# Patient Record
Sex: MALE | Race: WHITE | NOT HISPANIC OR LATINO | Employment: FULL TIME | URBAN - METROPOLITAN AREA
[De-identification: names, ages, dates, MRNs, and addresses within clinical notes are randomized per-mention and may not be internally consistent; named-entity substitution may affect disease eponyms.]

---

## 2021-09-23 ENCOUNTER — OFFICE VISIT (OUTPATIENT)
Dept: ORTHOPEDIC SURGERY | Facility: CLINIC | Age: 26
End: 2021-09-23

## 2021-09-23 VITALS
SYSTOLIC BLOOD PRESSURE: 135 MMHG | DIASTOLIC BLOOD PRESSURE: 87 MMHG | HEIGHT: 71 IN | BODY MASS INDEX: 32.9 KG/M2 | WEIGHT: 235 LBS

## 2021-09-23 DIAGNOSIS — R52 PAIN: Primary | ICD-10-CM

## 2021-09-23 DIAGNOSIS — S83.411A SPRAIN OF MEDIAL COLLATERAL LIGAMENT OF RIGHT KNEE, INITIAL ENCOUNTER: ICD-10-CM

## 2021-09-23 DIAGNOSIS — S83.271A COMPLEX TEAR OF LATERAL MENISCUS OF RIGHT KNEE AS CURRENT INJURY, INITIAL ENCOUNTER: ICD-10-CM

## 2021-09-23 DIAGNOSIS — S83.511A RUPTURE OF ANTERIOR CRUCIATE LIGAMENT OF RIGHT KNEE, INITIAL ENCOUNTER: ICD-10-CM

## 2021-09-23 PROCEDURE — 73564 X-RAY EXAM KNEE 4 OR MORE: CPT | Performed by: ORTHOPAEDIC SURGERY

## 2021-09-23 PROCEDURE — 99203 OFFICE O/P NEW LOW 30 MIN: CPT | Performed by: ORTHOPAEDIC SURGERY

## 2021-09-23 RX ORDER — BUPROPION HYDROCHLORIDE 150 MG/1
150 TABLET ORAL DAILY
COMMUNITY
Start: 2021-09-15

## 2021-09-23 RX ORDER — LEVOCETIRIZINE DIHYDROCHLORIDE 5 MG/1
5 TABLET, FILM COATED ORAL DAILY PRN
COMMUNITY

## 2021-09-23 RX ORDER — METHYLPREDNISOLONE 4 MG/1
TABLET ORAL
Qty: 1 EACH | Refills: 0 | Status: SHIPPED | OUTPATIENT
Start: 2021-09-23 | End: 2021-11-12

## 2021-09-23 RX ORDER — IBUPROFEN 800 MG/1
TABLET ORAL
COMMUNITY
Start: 2021-09-15 | End: 2021-11-12

## 2021-09-23 RX ORDER — MELOXICAM 15 MG/1
15 TABLET ORAL DAILY
Qty: 30 TABLET | Refills: 0 | Status: SHIPPED | OUTPATIENT
Start: 2021-09-23 | End: 2021-10-14

## 2021-09-23 NOTE — PROGRESS NOTES
"Subjective:     Patient ID: Zeyad Pedro is a 25 y.o. male.    Chief Complaint:  Right knee pain and instability  History of Present Illness  Zeyad Pedro presents to clinic today for evaluation of right knee pain. The patient reports a soccer injury that occurred on 09/14/2021 where he felt his knee \"pop out of place\" and felt like \"glass shattering\" on both sides. He reports his pain has gradually increased since onset of injury, since his swelling subsided. He notes an increase of pain occurred after obtaining his MRI. The patient describes throbbing, catching, and tightness in nature, localized to the medial joint line.  He rates pain as a 7-8 out of 10.  He denies numbness or tingling. He reports intermittent purple discoloration in his right foot.     Social History     Occupational History   • Not on file   Tobacco Use   • Smoking status: Never Smoker   • Smokeless tobacco: Never Used   Vaping Use   • Vaping Use: Never used   Substance and Sexual Activity   • Alcohol use: No   • Drug use: No   • Sexual activity: Defer      Past Medical History:   Diagnosis Date   • Fracture, tibia and fibula      Past Surgical History:   Procedure Laterality Date   • KNEE ACL RECONSTRUCTION Left 09/24/2015       History reviewed. No pertinent family history.      Review of Systems   Constitutional: Negative for chills, diaphoresis, fever and unexpected weight change.   HENT: Negative for hearing loss, nosebleeds, sore throat and tinnitus.    Eyes: Negative for pain and visual disturbance.   Respiratory: Negative for cough, shortness of breath and wheezing.    Cardiovascular: Negative for chest pain and palpitations.   Gastrointestinal: Negative for abdominal pain, diarrhea, nausea and vomiting.   Endocrine: Negative for cold intolerance, heat intolerance and polydipsia.   Genitourinary: Negative for difficulty urinating, dysuria and hematuria.   Musculoskeletal: Positive for arthralgias and myalgias. Negative for joint " "swelling.   Skin: Negative for rash and wound.   Allergic/Immunologic: Negative for environmental allergies.   Neurological: Negative for dizziness, syncope and numbness.   Hematological: Does not bruise/bleed easily.   Psychiatric/Behavioral: Negative for dysphoric mood and sleep disturbance. The patient is not nervous/anxious.            Objective:  Vitals:    09/23/21 1435   BP: 135/87   BP Location: Right arm   Weight: 107 kg (235 lb)   Height: 180.3 cm (71\")         09/23/21  1435   Weight: 107 kg (235 lb)     Body mass index is 32.78 kg/m².  Physical Exam    Vital signs reviewed.   General: No acute distress, alert and oriented  Eyes: conjunctiva clear; pupils equally round and reactive  ENT: external ears and nose atraumatic; oropharynx clear  CV: no peripheral edema  Resp: normal respiratory effort  Skin: no rashes or wounds; normal turgor  Psych: mood and affect appropriate; recent and remote memory intact          Ortho Exam     Right knee-  Moderate effusion noted, maximal tenderness along medial joint line as well as over the proximal MCL.  Grade 1 opening on valgus stress at 30 degrees, no opening at full extension.  No opening on varus stress at 0 or 30 degrees.  Tolerates right knee range of motion active and passive 0 to 90 degrees.  Grade 2B Lockman, 2+ anterior drawer with soft endpoint, negative posterior drawer, negative posterior lateral drawer, negative dial test.  Mildly positive active patellar compression test.  Positive sensation light touch all distributions right foot symmetric left, brisk cap refill all digits, 2+ dorsalis pedis pulse.    Imaging:  Reviewed outside MRI including review of radiology images as well as radiology report indicates complete disruption of ACL with mild LCL sprain and moderate sprain of the proximal MCL noted, complex tear the posterior horn lateral meniscus, no evidence of medial meniscal tear, region of chondral loss noted in the posterior lateral aspect of " the lateral tibial plateau with loose v. subchondral edema noted in the lateral femoral condyle and lateral tibial plateau consistent with pivot shift phenomenon.    Right Knee X-Ray  Indication: Pain    AP, Lateral, notch and [place] views    Findings:  No fracture  No bony lesion  Normal soft tissues  Normal joint spaces    No prior studies were available for comparison.      Assessment:        1. Pain    2. Rupture of anterior cruciate ligament of right knee, initial encounter    3. Complex tear of lateral meniscus of right knee as current injury, initial encounter    4. Sprain of medial collateral ligament of right knee, initial encounter           Plan:          1. Discussed treatment options at length with patient at today's visit. Including the use of a knee brace for support, and a short course oral steroid followed by an anti-inflammatory for 10 days.   2. Patient was given knee brace today for support to try to allow for early healing of the MCL strain. I advised the patient to wear this constantly for better support.   3. 5-day course of a oral steroid and Meloxicam sent to patient pharmacy for residual pain and inflammation.  4. Follow-up: 3 weeks to reevaluate his right knee.  If his motion and effusion are better at that point time we will discuss further options for surgical treatment in light of his lateral meniscus tear and ACL rupture as well as persistent instability.      Zeyad Willis was in agreement with plan and had all questions answered.     Orders:  Orders Placed This Encounter   Procedures   • XR Knee 4+ View Right       Medications:  New Medications Ordered This Visit   Medications   • methylPREDNISolone (MEDROL) 4 MG dose pack     Sig: Take as directed on package instructions.     Dispense:  1 each     Refill:  0   • meloxicam (MOBIC) 15 MG tablet     Sig: Take 1 tablet by mouth Daily.     Dispense:  30 tablet     Refill:  0       Followup:  No follow-ups on file.    Diagnoses and all  orders for this visit:    1. Pain (Primary)  -     XR Knee 4+ View Right    2. Rupture of anterior cruciate ligament of right knee, initial encounter    3. Complex tear of lateral meniscus of right knee as current injury, initial encounter    4. Sprain of medial collateral ligament of right knee, initial encounter    Other orders  -     methylPREDNISolone (MEDROL) 4 MG dose pack; Take as directed on package instructions.  Dispense: 1 each; Refill: 0  -     meloxicam (MOBIC) 15 MG tablet; Take 1 tablet by mouth Daily.  Dispense: 30 tablet; Refill: 0          Dictated utilizing Dragon dictation     Scribed for Negro Sahu MD by Arslan Russo.  9/28/2021  17:17 EDT

## 2021-10-14 ENCOUNTER — OFFICE VISIT (OUTPATIENT)
Dept: ORTHOPEDIC SURGERY | Facility: CLINIC | Age: 26
End: 2021-10-14

## 2021-10-14 VITALS — WEIGHT: 235 LBS | BODY MASS INDEX: 32.9 KG/M2 | HEIGHT: 71 IN

## 2021-10-14 DIAGNOSIS — S83.511D RUPTURE OF ANTERIOR CRUCIATE LIGAMENT OF RIGHT KNEE, SUBSEQUENT ENCOUNTER: Primary | ICD-10-CM

## 2021-10-14 DIAGNOSIS — S83.271D COMPLEX TEAR OF LATERAL MENISCUS OF RIGHT KNEE AS CURRENT INJURY, SUBSEQUENT ENCOUNTER: ICD-10-CM

## 2021-10-14 DIAGNOSIS — S83.411D SPRAIN OF MEDIAL COLLATERAL LIGAMENT OF RIGHT KNEE, SUBSEQUENT ENCOUNTER: ICD-10-CM

## 2021-10-14 PROCEDURE — 99214 OFFICE O/P EST MOD 30 MIN: CPT | Performed by: ORTHOPAEDIC SURGERY

## 2021-10-14 RX ORDER — MELOXICAM 15 MG/1
TABLET ORAL
Qty: 30 TABLET | Refills: 0 | Status: SHIPPED | OUTPATIENT
Start: 2021-10-14 | End: 2021-11-12

## 2021-10-14 NOTE — PROGRESS NOTES
"Subjective:     Patient ID: Zeyad Pedro is a 25 y.o. male.    Chief Complaint:  Follow-up right knee pain and instability, ACL tear, lateral meniscus tear, MCL sprain  Medrol Dosepak followed by meloxicam to help with effusion    History of Present Illness     Zeyad Pedro returns to clinic today for follow up on his right knee.    The patient is making progress. He notes that he still has some mild residual pain, primarily over the proximal MCL and lateral joint line. He rates his pain as a 2 to 3 out of 10. He feels like his stability and motion have moderately improved with the medications we gave him last time. He is still having concerns for buckling and instability episodes, particularly when he is out of his brace. He would like to proceed with surgical treatment at this time     Social History     Occupational History   • Not on file   Tobacco Use   • Smoking status: Never Smoker   • Smokeless tobacco: Never Used   Vaping Use   • Vaping Use: Never used   Substance and Sexual Activity   • Alcohol use: No   • Drug use: No   • Sexual activity: Defer      Past Medical History:   Diagnosis Date   • Fracture, tibia and fibula      Past Surgical History:   Procedure Laterality Date   • KNEE ACL RECONSTRUCTION Left 09/24/2015       No family history on file.      Review of Systems        Objective:  Vitals:    10/14/21 1340   Weight: 107 kg (235 lb)   Height: 180.3 cm (71\")         10/14/21  1340   Weight: 107 kg (235 lb)     Body mass index is 32.78 kg/m².  Physical Exam    Vital signs reviewed.   General: No acute distress, alert and oriented  Eyes: conjunctiva clear; pupils equally round and reactive  ENT: external ears and nose atraumatic; oropharynx clear  CV: no peripheral edema  Resp: normal respiratory effort  Skin: no rashes or wounds; normal turgor  Psych: mood and affect appropriate; recent and remote memory intact          Ortho Exam       Right Knee -      ROM 0 to 110 degrees  4/5 on flexion  4/5 on " extension  Maximal tenderness over the proximal MCL region. His swelling has improved.   Moderate tenderness lateral joint line     Effusion - Moderate residual   Grade 2B Lachman  Anterior drawer- 2+ with soft endpoint.  Posterior drawer- Negative  Posterior lateral drawer - Negative  Mild Grade 1 opening on valgus stress at 30 degrees    Dial Test - Negative  Berot - Positive     Positive sensation light touch all distributions symmetric to contralateral side  Brisk cap refill all digits  2+ dorsalis pedis pulse    Imaging:  Review again of outside MRI right knee include review of images indicates moderate proximal MCL sprain with complete ACL tear and complex tear posterior horn lateral meniscus    Assessment:        1. Rupture of anterior cruciate ligament of right knee, subsequent encounter    2. Sprain of medial collateral ligament of right knee, subsequent encounter    3. Complex tear of lateral meniscus of right knee as current injury, subsequent encounter           Plan:          1. Discussed treatment options at length with patient at today's visit.   2. The patient is doing better in regards to motion at this point in time. Given the fact that he still has his ACL tear and residual instability, he would like to proceed with surgical treatment.  The patient would like to proceed with ACL reconstruction with hamstring autograft, possible allograft supplement, meniscal and cartilage surgery as indicated and all associated procedures. Reviewed anatomy of the knee, as well as details of procedure, including risks, benefits, and alternatives. Risks discussed included but were not limited to bleeding, infection, neurovascular damage, re-tear of ACL graft, failure of incorporation of graft, disease transmission, chronic pain, laxity, swelling, stiffness, loss of motion, weakness, instability, fracture, re-tear of meniscus, DVT, pulmonary embolus, death, stroke, complex regional pain syndrome, myocardial  infarction, and need for additional procedures. Patient understood all these and had all questions answered, and verbally consented to proceeding with surgery. We discussed typical postop recovery of 6-12 months and no guarantee of being able to return to previous activities. No guarantees were given in regards to the procedure. We will plan on proceeding with surgery at next available date.   3. The patient denies a history of blood clots, diabetes or heart issues.  4. Follow up for surgery.      Zeyad Pedro was in agreement with plan and had all questions answered.     Orders:  Orders Placed This Encounter   Procedures   • COVID PRE-OP / PRE-PROCEDURE SCREENING ORDER (NO ISOLATION) - Swab, Nasopharynx   • Follow Anesthesia Guidelines / Standing Orders       Medications:  No orders of the defined types were placed in this encounter.      Followup:  No follow-ups on file.    Diagnoses and all orders for this visit:    1. Rupture of anterior cruciate ligament of right knee, subsequent encounter (Primary)  -     Case Request; Standing  -     COVID PRE-OP / PRE-PROCEDURE SCREENING ORDER (NO ISOLATION) - Swab, Nasopharynx; Future  -     Case Request    2. Sprain of medial collateral ligament of right knee, subsequent encounter    3. Complex tear of lateral meniscus of right knee as current injury, subsequent encounter  -     Case Request; Standing  -     COVID PRE-OP / PRE-PROCEDURE SCREENING ORDER (NO ISOLATION) - Swab, Nasopharynx; Future  -     Case Request    Other orders  -     Follow Anesthesia Guidelines / Standing Orders; Future          Dictated utilizing Dragon dictation     Transcribed from ambient dictation for Negro Sahu MD by Kyle Colby.  10/14/21   17:46 EDT    I have personally performed the services described in this document as transcribed by the above individual, and it is both accurate and complete.  Negro Sahu MD  10/28/2021  07:44 EDT

## 2021-10-28 RX ORDER — MELOXICAM 7.5 MG/1
7.5 TABLET ORAL ONCE
Status: CANCELLED | OUTPATIENT
Start: 2021-10-28 | End: 2021-10-28

## 2021-10-28 RX ORDER — ACETAMINOPHEN 325 MG/1
1000 TABLET ORAL ONCE
Status: CANCELLED | OUTPATIENT
Start: 2021-10-28 | End: 2021-10-28

## 2021-10-28 RX ORDER — PREGABALIN 150 MG/1
150 CAPSULE ORAL ONCE
Status: CANCELLED | OUTPATIENT
Start: 2021-10-28 | End: 2021-10-28

## 2021-11-11 ENCOUNTER — TELEPHONE (OUTPATIENT)
Dept: ORTHOPEDIC SURGERY | Facility: CLINIC | Age: 26
End: 2021-11-11

## 2021-11-11 NOTE — TELEPHONE ENCOUNTER
Provider: DR. JIMENEZ    Caller: Zeyad Pedro     Relationship to Patient: SELF    Phone Number: [phone]    Reason for Call: PATIENT ADVISED HE HAS SOME PER SURGERY QUESTION FOR DR. JIMENEZ. PATIENT WANTS TO KNOW WHAT HE SHOULD DO TO PREPARE FOR SURGERY? HE ALSO WANTS TO KNOW IS HE ALLOWED TO HAVE GUEST IN HIS ROOM BEFORE SURGERY? PATIENT WANTS TO KNOW WILL THE OFFICE SCHEDULE A COVID TEST BEFORE SURGERY? PLEASE CONTACT PATIENT BACK INFORM.

## 2021-11-12 RX ORDER — ACETAMINOPHEN 500 MG
1000 TABLET ORAL EVERY 6 HOURS PRN
COMMUNITY
End: 2021-11-19 | Stop reason: HOSPADM

## 2021-11-15 ENCOUNTER — TELEPHONE (OUTPATIENT)
Dept: ORTHOPEDIC SURGERY | Facility: CLINIC | Age: 26
End: 2021-11-15

## 2021-11-15 NOTE — TELEPHONE ENCOUNTER
Caller: Zeyad Pedro    Relationship to patient: Self    Best call back number:[phone]    Patient is needing: PATIENT HAS KNEE SURGERY ON Friday 11/19/21 AND HE HAS SEVERAL QUESTIONS REGARDING THE KNEE BRACE THAT HE WILL NEED AFTER SURGERY, HE ASKED THAT DR. JIMENEZ CALL HIM BACK TODAY.

## 2021-11-18 ENCOUNTER — HOSPITAL ENCOUNTER (OUTPATIENT)
Dept: GENERAL RADIOLOGY | Facility: HOSPITAL | Age: 26
Discharge: HOME | End: 2021-11-18

## 2021-11-18 ENCOUNTER — ANESTHESIA EVENT (OUTPATIENT)
Dept: PERIOP | Facility: HOSPITAL | Age: 26
End: 2021-11-18

## 2021-11-19 ENCOUNTER — ANESTHESIA (OUTPATIENT)
Dept: PERIOP | Facility: HOSPITAL | Age: 26
End: 2021-11-19

## 2021-11-19 ENCOUNTER — HOSPITAL ENCOUNTER (OUTPATIENT)
Facility: HOSPITAL | Age: 26
Setting detail: HOSPITAL OUTPATIENT SURGERY
Discharge: HOME | End: 2021-11-19
Attending: ORTHOPAEDIC SURGERY | Admitting: ORTHOPAEDIC SURGERY

## 2021-11-19 VITALS
WEIGHT: 245.4 LBS | SYSTOLIC BLOOD PRESSURE: 107 MMHG | BODY MASS INDEX: 34.35 KG/M2 | DIASTOLIC BLOOD PRESSURE: 71 MMHG | RESPIRATION RATE: 15 BRPM | TEMPERATURE: 97.6 F | HEART RATE: 81 BPM | OXYGEN SATURATION: 97 % | HEIGHT: 71 IN

## 2021-11-19 DIAGNOSIS — S83.271D COMPLEX TEAR OF LATERAL MENISCUS OF RIGHT KNEE AS CURRENT INJURY, SUBSEQUENT ENCOUNTER: ICD-10-CM

## 2021-11-19 DIAGNOSIS — S83.511D RUPTURE OF ANTERIOR CRUCIATE LIGAMENT OF RIGHT KNEE, SUBSEQUENT ENCOUNTER: ICD-10-CM

## 2021-11-19 LAB
APTT PPP: 30.2 SECONDS (ref 24.3–38.1)
DEPRECATED RDW RBC AUTO: 43.2 FL (ref 37–54)
ERYTHROCYTE [DISTWIDTH] IN BLOOD BY AUTOMATED COUNT: 12.3 % (ref 12.3–15.4)
HCT VFR BLD AUTO: 40.5 % (ref 37.5–51)
HGB BLD-MCNC: 13.1 G/DL (ref 13–17.7)
INR PPP: 1.08 (ref 0.9–1.1)
MCH RBC QN AUTO: 30.1 PG (ref 26.6–33)
MCHC RBC AUTO-ENTMCNC: 32.3 G/DL (ref 31.5–35.7)
MCV RBC AUTO: 93.1 FL (ref 79–97)
PLATELET # BLD AUTO: 210 10*3/MM3 (ref 140–450)
PMV BLD AUTO: 9.6 FL (ref 6–12)
PROTHROMBIN TIME: 14 SECONDS (ref 12.1–15)
RBC # BLD AUTO: 4.35 10*6/MM3 (ref 4.14–5.8)
WBC NRBC COR # BLD: 4.19 10*3/MM3 (ref 3.4–10.8)

## 2021-11-19 PROCEDURE — 85027 COMPLETE CBC AUTOMATED: CPT | Performed by: ORTHOPAEDIC SURGERY

## 2021-11-19 PROCEDURE — C1713 ANCHOR/SCREW BN/BN,TIS/BN: HCPCS | Performed by: ORTHOPAEDIC SURGERY

## 2021-11-19 PROCEDURE — 0 CEFAZOLIN SODIUM-DEXTROSE 2-3 GM-%(50ML) RECONSTITUTED SOLUTION: Performed by: ORTHOPAEDIC SURGERY

## 2021-11-19 PROCEDURE — 76942 ECHO GUIDE FOR BIOPSY: CPT | Performed by: ORTHOPAEDIC SURGERY

## 2021-11-19 PROCEDURE — 25010000002 FENTANYL CITRATE (PF) 50 MCG/ML SOLUTION: Performed by: NURSE ANESTHETIST, CERTIFIED REGISTERED

## 2021-11-19 PROCEDURE — 29888 ARTHRS AID ACL RPR/AGMNTJ: CPT | Performed by: SPECIALIST/TECHNOLOGIST, OTHER

## 2021-11-19 PROCEDURE — 29882 ARTHRS KNE SRG MNISC RPR M/L: CPT | Performed by: ORTHOPAEDIC SURGERY

## 2021-11-19 PROCEDURE — 85610 PROTHROMBIN TIME: CPT | Performed by: ORTHOPAEDIC SURGERY

## 2021-11-19 PROCEDURE — 25010000002 DEXAMETHASONE PER 1 MG: Performed by: NURSE ANESTHETIST, CERTIFIED REGISTERED

## 2021-11-19 PROCEDURE — 25010000002 ONDANSETRON PER 1 MG: Performed by: NURSE ANESTHETIST, CERTIFIED REGISTERED

## 2021-11-19 PROCEDURE — 25010000002 ROPIVACAINE PER 1 MG: Performed by: NURSE ANESTHETIST, CERTIFIED REGISTERED

## 2021-11-19 PROCEDURE — C1889 IMPLANT/INSERT DEVICE, NOC: HCPCS | Performed by: ORTHOPAEDIC SURGERY

## 2021-11-19 PROCEDURE — 85730 THROMBOPLASTIN TIME PARTIAL: CPT | Performed by: ORTHOPAEDIC SURGERY

## 2021-11-19 PROCEDURE — 29888 ARTHRS AID ACL RPR/AGMNTJ: CPT | Performed by: ORTHOPAEDIC SURGERY

## 2021-11-19 PROCEDURE — 25010000002 MIDAZOLAM PER 1MG: Performed by: NURSE ANESTHETIST, CERTIFIED REGISTERED

## 2021-11-19 PROCEDURE — 25010000002 PROPOFOL 10 MG/ML EMULSION: Performed by: NURSE ANESTHETIST, CERTIFIED REGISTERED

## 2021-11-19 PROCEDURE — 25010000002 HYDROMORPHONE 1 MG/ML SOLUTION: Performed by: NURSE ANESTHETIST, CERTIFIED REGISTERED

## 2021-11-19 PROCEDURE — 87635 SARS-COV-2 COVID-19 AMP PRB: CPT | Performed by: ORTHOPAEDIC SURGERY

## 2021-11-19 DEVICE — CORTICAL SCREW 4.5MM X 42MM: Type: IMPLANTABLE DEVICE | Site: KNEE | Status: FUNCTIONAL

## 2021-11-19 DEVICE — SUT NONABS LOOPED W/STR/NDL COBR SZ2 20IN WHT/BLU: Type: IMPLANTABLE DEVICE | Site: KNEE | Status: FUNCTIONAL

## 2021-11-19 DEVICE — FAST-FIX 360 CURVED MENISCAL                                    REPAIR SYSTEM
Type: IMPLANTABLE DEVICE | Site: KNEE | Status: FUNCTIONAL
Brand: FAST-FIX

## 2021-11-19 DEVICE — ULTRABRAID NO.2 WHITE SUTURE AND                                    NEEDLE ASSEMBLY, 38 INCH, 10 PER                                    BOX, STERILE
Type: IMPLANTABLE DEVICE | Site: KNEE | Status: FUNCTIONAL
Brand: ULTRABRAID

## 2021-11-19 DEVICE — ULTRABUTTON ADJUSTABLE FIXATION DEVICE
Type: IMPLANTABLE DEVICE | Site: KNEE | Status: FUNCTIONAL
Brand: ULTRABUTTON

## 2021-11-19 DEVICE — FAST-FIX 360 REVERSED CURVE                                    MENISCAL REPAIR SYSTEM
Type: IMPLANTABLE DEVICE | Site: KNEE | Status: FUNCTIONAL
Brand: FAST-FIX

## 2021-11-19 DEVICE — SPIKED WASHER 17MM: Type: IMPLANTABLE DEVICE | Site: KNEE | Status: FUNCTIONAL

## 2021-11-19 DEVICE — TNDN SEMITENDENOSIS FZ MIN4MM 004023: Type: IMPLANTABLE DEVICE | Site: KNEE | Status: FUNCTIONAL

## 2021-11-19 DEVICE — SUT NONABS LOOPED W/STR/NDL SZ2 20IN BLU: Type: IMPLANTABLE DEVICE | Site: KNEE | Status: FUNCTIONAL

## 2021-11-19 DEVICE — BIOSURE REGENSORB INTERFERENCE                                    SCREW 10 MM X 30MM
Type: IMPLANTABLE DEVICE | Site: KNEE | Status: FUNCTIONAL
Brand: BIOSURE

## 2021-11-19 RX ORDER — MINERAL OIL 471.99 G/472ML
OIL TOPICAL AS NEEDED
Status: DISCONTINUED | OUTPATIENT
Start: 2021-11-19 | End: 2021-11-19 | Stop reason: HOSPADM

## 2021-11-19 RX ORDER — MIDAZOLAM HYDROCHLORIDE 2 MG/2ML
1 INJECTION, SOLUTION INTRAMUSCULAR; INTRAVENOUS
Status: DISCONTINUED | OUTPATIENT
Start: 2021-11-19 | End: 2021-11-19 | Stop reason: HOSPADM

## 2021-11-19 RX ORDER — CEFAZOLIN SODIUM 2 G/50ML
2 SOLUTION INTRAVENOUS ONCE
Status: COMPLETED | OUTPATIENT
Start: 2021-11-19 | End: 2021-11-19

## 2021-11-19 RX ORDER — DEXAMETHASONE SODIUM PHOSPHATE 4 MG/ML
INJECTION, SOLUTION INTRA-ARTICULAR; INTRALESIONAL; INTRAMUSCULAR; INTRAVENOUS; SOFT TISSUE AS NEEDED
Status: DISCONTINUED | OUTPATIENT
Start: 2021-11-19 | End: 2021-11-19

## 2021-11-19 RX ORDER — FAMOTIDINE 10 MG/ML
20 INJECTION, SOLUTION INTRAVENOUS
Status: DISCONTINUED | OUTPATIENT
Start: 2021-11-19 | End: 2021-11-19 | Stop reason: HOSPADM

## 2021-11-19 RX ORDER — SODIUM CHLORIDE 0.9 % (FLUSH) 0.9 %
10 SYRINGE (ML) INJECTION AS NEEDED
Status: DISCONTINUED | OUTPATIENT
Start: 2021-11-19 | End: 2021-11-19 | Stop reason: HOSPADM

## 2021-11-19 RX ORDER — ROPIVACAINE HYDROCHLORIDE 2 MG/ML
INJECTION, SOLUTION EPIDURAL; INFILTRATION; PERINEURAL
Status: COMPLETED | OUTPATIENT
Start: 2021-11-19 | End: 2021-11-19

## 2021-11-19 RX ORDER — ONDANSETRON 2 MG/ML
4 INJECTION INTRAMUSCULAR; INTRAVENOUS ONCE AS NEEDED
Status: COMPLETED | OUTPATIENT
Start: 2021-11-19 | End: 2021-11-19

## 2021-11-19 RX ORDER — ASPIRIN 81 MG/1
81 TABLET ORAL DAILY
Qty: 14 TABLET | Refills: 0 | Status: SHIPPED | OUTPATIENT
Start: 2021-11-19 | End: 2021-12-03

## 2021-11-19 RX ORDER — ONDANSETRON 4 MG/1
4 TABLET, FILM COATED ORAL EVERY 8 HOURS PRN
Qty: 30 TABLET | Refills: 0 | Status: SHIPPED | OUTPATIENT
Start: 2021-11-19 | End: 2021-11-29

## 2021-11-19 RX ORDER — KETAMINE HYDROCHLORIDE 10 MG/ML
INJECTION INTRAMUSCULAR; INTRAVENOUS AS NEEDED
Status: DISCONTINUED | OUTPATIENT
Start: 2021-11-19 | End: 2021-11-19 | Stop reason: SURG

## 2021-11-19 RX ORDER — PROPOFOL 10 MG/ML
VIAL (ML) INTRAVENOUS AS NEEDED
Status: DISCONTINUED | OUTPATIENT
Start: 2021-11-19 | End: 2021-11-19 | Stop reason: SURG

## 2021-11-19 RX ORDER — MAGNESIUM HYDROXIDE 1200 MG/15ML
LIQUID ORAL AS NEEDED
Status: DISCONTINUED | OUTPATIENT
Start: 2021-11-19 | End: 2021-11-19 | Stop reason: HOSPADM

## 2021-11-19 RX ORDER — SODIUM CHLORIDE 0.9 % (FLUSH) 0.9 %
10 SYRINGE (ML) INJECTION EVERY 12 HOURS SCHEDULED
Status: DISCONTINUED | OUTPATIENT
Start: 2021-11-19 | End: 2021-11-19 | Stop reason: HOSPADM

## 2021-11-19 RX ORDER — LIDOCAINE HYDROCHLORIDE 10 MG/ML
0.5 INJECTION, SOLUTION EPIDURAL; INFILTRATION; INTRACAUDAL; PERINEURAL ONCE AS NEEDED
Status: COMPLETED | OUTPATIENT
Start: 2021-11-19 | End: 2021-11-19

## 2021-11-19 RX ORDER — SODIUM CHLORIDE, SODIUM LACTATE, POTASSIUM CHLORIDE, CALCIUM CHLORIDE 600; 310; 30; 20 MG/100ML; MG/100ML; MG/100ML; MG/100ML
9 INJECTION, SOLUTION INTRAVENOUS CONTINUOUS
Status: DISCONTINUED | OUTPATIENT
Start: 2021-11-19 | End: 2021-11-19 | Stop reason: HOSPADM

## 2021-11-19 RX ORDER — MELOXICAM 7.5 MG/1
7.5 TABLET ORAL ONCE
Status: COMPLETED | OUTPATIENT
Start: 2021-11-19 | End: 2021-11-19

## 2021-11-19 RX ORDER — DEXMEDETOMIDINE HYDROCHLORIDE 100 UG/ML
INJECTION, SOLUTION INTRAVENOUS AS NEEDED
Status: DISCONTINUED | OUTPATIENT
Start: 2021-11-19 | End: 2021-11-19 | Stop reason: SURG

## 2021-11-19 RX ORDER — SENNA PLUS 8.6 MG/1
1 TABLET ORAL NIGHTLY
Qty: 30 TABLET | Refills: 0 | Status: SHIPPED | OUTPATIENT
Start: 2021-11-19 | End: 2021-12-16

## 2021-11-19 RX ORDER — SODIUM CHLORIDE, SODIUM LACTATE, POTASSIUM CHLORIDE, CALCIUM CHLORIDE 600; 310; 30; 20 MG/100ML; MG/100ML; MG/100ML; MG/100ML
100 INJECTION, SOLUTION INTRAVENOUS CONTINUOUS
Status: DISCONTINUED | OUTPATIENT
Start: 2021-11-19 | End: 2021-11-19 | Stop reason: HOSPADM

## 2021-11-19 RX ORDER — FENTANYL CITRATE 50 UG/ML
INJECTION, SOLUTION INTRAMUSCULAR; INTRAVENOUS AS NEEDED
Status: DISCONTINUED | OUTPATIENT
Start: 2021-11-19 | End: 2021-11-19 | Stop reason: SURG

## 2021-11-19 RX ORDER — ACETAMINOPHEN 500 MG
1000 TABLET ORAL ONCE
Status: COMPLETED | OUTPATIENT
Start: 2021-11-19 | End: 2021-11-19

## 2021-11-19 RX ORDER — SODIUM CHLORIDE 9 MG/ML
40 INJECTION, SOLUTION INTRAVENOUS AS NEEDED
Status: DISCONTINUED | OUTPATIENT
Start: 2021-11-19 | End: 2021-11-19 | Stop reason: HOSPADM

## 2021-11-19 RX ORDER — ONDANSETRON 2 MG/ML
4 INJECTION INTRAMUSCULAR; INTRAVENOUS ONCE AS NEEDED
Status: DISCONTINUED | OUTPATIENT
Start: 2021-11-19 | End: 2021-11-19 | Stop reason: HOSPADM

## 2021-11-19 RX ORDER — OXYCODONE AND ACETAMINOPHEN 7.5; 325 MG/1; MG/1
1 TABLET ORAL ONCE AS NEEDED
Status: DISCONTINUED | OUTPATIENT
Start: 2021-11-19 | End: 2021-11-19 | Stop reason: HOSPADM

## 2021-11-19 RX ORDER — OXYCODONE HYDROCHLORIDE AND ACETAMINOPHEN 5; 325 MG/1; MG/1
1 TABLET ORAL EVERY 4 HOURS PRN
Qty: 42 TABLET | Refills: 0 | Status: SHIPPED | OUTPATIENT
Start: 2021-11-19 | End: 2021-11-29

## 2021-11-19 RX ORDER — FENTANYL CITRATE 50 UG/ML
50 INJECTION, SOLUTION INTRAMUSCULAR; INTRAVENOUS
Status: DISCONTINUED | OUTPATIENT
Start: 2021-11-19 | End: 2021-11-19 | Stop reason: HOSPADM

## 2021-11-19 RX ORDER — LIDOCAINE HYDROCHLORIDE 20 MG/ML
INJECTION, SOLUTION INFILTRATION; PERINEURAL AS NEEDED
Status: DISCONTINUED | OUTPATIENT
Start: 2021-11-19 | End: 2021-11-19 | Stop reason: SURG

## 2021-11-19 RX ORDER — DEXAMETHASONE SODIUM PHOSPHATE 4 MG/ML
INJECTION, SOLUTION INTRA-ARTICULAR; INTRALESIONAL; INTRAMUSCULAR; INTRAVENOUS; SOFT TISSUE AS NEEDED
Status: DISCONTINUED | OUTPATIENT
Start: 2021-11-19 | End: 2021-11-19 | Stop reason: SURG

## 2021-11-19 RX ORDER — PREGABALIN 75 MG/1
150 CAPSULE ORAL ONCE
Status: COMPLETED | OUTPATIENT
Start: 2021-11-19 | End: 2021-11-19

## 2021-11-19 RX ADMIN — FENTANYL CITRATE 50 MCG: 50 INJECTION INTRAMUSCULAR; INTRAVENOUS at 12:49

## 2021-11-19 RX ADMIN — FENTANYL CITRATE 50 MCG: 50 INJECTION INTRAMUSCULAR; INTRAVENOUS at 14:56

## 2021-11-19 RX ADMIN — HYDROMORPHONE HYDROCHLORIDE 1 MG: 1 INJECTION, SOLUTION INTRAMUSCULAR; INTRAVENOUS; SUBCUTANEOUS at 14:04

## 2021-11-19 RX ADMIN — DEXAMETHASONE SODIUM PHOSPHATE 4 MG: 4 INJECTION, SOLUTION INTRAMUSCULAR; INTRAVENOUS at 09:38

## 2021-11-19 RX ADMIN — ONDANSETRON 4 MG: 2 INJECTION INTRAMUSCULAR; INTRAVENOUS at 14:41

## 2021-11-19 RX ADMIN — DEXMEDETOMIDINE 10 MCG: 100 INJECTION, SOLUTION, CONCENTRATE INTRAVENOUS at 09:38

## 2021-11-19 RX ADMIN — LIDOCAINE HYDROCHLORIDE 50 MG: 20 INJECTION, SOLUTION INFILTRATION; PERINEURAL at 11:03

## 2021-11-19 RX ADMIN — ACETAMINOPHEN 1000 MG: 500 TABLET, FILM COATED ORAL at 08:45

## 2021-11-19 RX ADMIN — ROPIVACAINE HYDROCHLORIDE 20 ML: 2 INJECTION, SOLUTION EPIDURAL; INFILTRATION; PERINEURAL at 09:44

## 2021-11-19 RX ADMIN — DEXMEDETOMIDINE 10 MCG: 100 INJECTION, SOLUTION, CONCENTRATE INTRAVENOUS at 09:44

## 2021-11-19 RX ADMIN — FENTANYL CITRATE 50 MCG: 50 INJECTION INTRAMUSCULAR; INTRAVENOUS at 15:03

## 2021-11-19 RX ADMIN — CEFAZOLIN SODIUM 2 G: 2 SOLUTION INTRAVENOUS at 11:07

## 2021-11-19 RX ADMIN — PROPOFOL 200 MG: 10 INJECTION, EMULSION INTRAVENOUS at 11:03

## 2021-11-19 RX ADMIN — KETAMINE HYDROCHLORIDE 20 MG: 10 INJECTION, SOLUTION INTRAMUSCULAR; INTRAVENOUS at 11:50

## 2021-11-19 RX ADMIN — PREGABALIN 150 MG: 75 CAPSULE ORAL at 08:45

## 2021-11-19 RX ADMIN — KETAMINE HYDROCHLORIDE 20 MG: 10 INJECTION, SOLUTION INTRAMUSCULAR; INTRAVENOUS at 12:37

## 2021-11-19 RX ADMIN — OXYCODONE HYDROCHLORIDE AND ACETAMINOPHEN 1 TABLET: 7.5; 325 TABLET ORAL at 14:18

## 2021-11-19 RX ADMIN — SODIUM CHLORIDE, POTASSIUM CHLORIDE, SODIUM LACTATE AND CALCIUM CHLORIDE 9 ML/HR: 600; 310; 30; 20 INJECTION, SOLUTION INTRAVENOUS at 08:46

## 2021-11-19 RX ADMIN — MELOXICAM 7.5 MG: 7.5 TABLET ORAL at 08:45

## 2021-11-19 RX ADMIN — MIDAZOLAM HYDROCHLORIDE 1 MG: 2 INJECTION, SOLUTION INTRAMUSCULAR; INTRAVENOUS at 09:30

## 2021-11-19 RX ADMIN — DEXAMETHASONE SODIUM PHOSPHATE 4 MG: 4 INJECTION, SOLUTION INTRAMUSCULAR; INTRAVENOUS at 09:44

## 2021-11-19 RX ADMIN — ROPIVACAINE HYDROCHLORIDE 20 ML: 2 INJECTION, SOLUTION EPIDURAL; INFILTRATION at 09:38

## 2021-11-19 RX ADMIN — LIDOCAINE HYDROCHLORIDE 0.5 ML: 10 INJECTION, SOLUTION EPIDURAL; INFILTRATION; INTRACAUDAL; PERINEURAL at 08:45

## 2021-11-19 NOTE — ANESTHESIA PROCEDURE NOTES
Peripheral Block    Pre-sedation assessment completed: 11/19/2021 9:30 AM    Patient reassessed immediately prior to procedure    Patient location during procedure: pre-op  Start time: 11/19/2021 9:35 AM  Stop time: 11/19/2021 9:38 AM  Reason for block: at surgeon's request and post-op pain management  Performed by  CRNA: Lai Brewer CRNA  Preanesthetic Checklist  Completed: patient identified, IV checked, site marked, risks and benefits discussed, surgical consent, monitors and equipment checked, pre-op evaluation and timeout performed  Prep:  Pt Position: supine  Sterile barriers:cap, gloves, mask and washed/disinfected hands  Prep: ChloraPrep  Patient monitoring: blood pressure monitoring, continuous pulse oximetry and EKG  Procedure    Sedation: yes  Performed under: local infiltration  Guidance:ultrasound guided    ULTRASOUND INTERPRETATION. Using ultrasound guidance a 21 G gauge needle was placed in close proximity to the nerve, at which point, under ultrasound guidance anesthetic was injected in the area of the nerve and spread of the anesthesia was seen on ultrasound in close proximity thereto.  There were no abnormalities seen on ultrasound; a digital image was taken; and the patient tolerated the procedure with no complications. Images:still images obtained, printed/placed on chart    Laterality:rightInjection Technique:single-shot  Needle Type:echogenic  Needle Gauge:21 G  Resistance on Injection: none    Medications Used: ropivacaine (NAROPIN) 0.2% injection, 20 mL  Med administered at 11/19/2021 9:38 AM      Post Assessment  Injection Assessment: negative aspiration for heme, no paresthesia on injection and incremental injection  Patient Tolerance:comfortable throughout block  Complications:no

## 2021-11-19 NOTE — ANESTHESIA PROCEDURE NOTES
Peripheral Block    Pre-sedation assessment completed: 11/19/2021 9:30 AM    Patient reassessed immediately prior to procedure    Patient location during procedure: pre-op  Start time: 11/19/2021 9:42 AM  Stop time: 11/19/2021 9:44 AM  Reason for block: at surgeon's request and post-op pain management  Performed by  CRNA: Lai Brewer CRNA  Preanesthetic Checklist  Completed: patient identified, IV checked, site marked, risks and benefits discussed, surgical consent, monitors and equipment checked, pre-op evaluation and timeout performed  Prep:  Pt Position: supine  Sterile barriers:cap, gloves, mask and washed/disinfected hands  Prep: ChloraPrep  Patient monitoring: blood pressure monitoring, continuous pulse oximetry and EKG  Procedure    Sedation: yes  Performed under: local infiltration  Guidance:nerve stimulator    ULTRASOUND INTERPRETATION. Using ultrasound guidance a 21 G gauge needle was placed in close proximity to the nerve, at which point, under ultrasound guidance anesthetic was injected in the area of the nerve and spread of the anesthesia was seen on ultrasound in close proximity thereto.  There were no abnormalities seen on ultrasound; a digital image was taken; and the patient tolerated the procedure with no complications.   Laterality:right  Block Type:sciatic  Injection Technique:single-shot  Needle Type:echogenic  Needle Gauge:21 G  Resistance on Injection: none    Medications Used: ropivacaine (NAROPIN) 0.2% injection, 20 mL  Med administered at 11/19/2021 9:44 AM      Post Assessment  Injection Assessment: negative aspiration for heme, no paresthesia on injection and incremental injection  Patient Tolerance:comfortable throughout block  Complications:no

## 2021-11-19 NOTE — ANESTHESIA PROCEDURE NOTES
Airway  Urgency: elective    Date/Time: 11/19/2021 11:03 AM  Airway not difficult    General Information and Staff    Patient location during procedure: OR  CRNA: Sanjay Ernandez CRNA    Indications and Patient Condition  Indications for airway management: airway protection    Preoxygenated: yes  Mask difficulty assessment: 0 - not attempted    Final Airway Details  Final airway type: supraglottic airway      Successful airway: unique  Size 4    Number of attempts at approach: 1  Assessment: lips, teeth, and gum same as pre-op

## 2021-11-19 NOTE — ANESTHESIA PREPROCEDURE EVALUATION
Anesthesia Evaluation     Patient summary reviewed and Nursing notes reviewed   no history of anesthetic complications:  NPO Solid Status: > 8 hours  NPO Liquid Status: > 2 hours           Airway   Mallampati: I  TM distance: >3 FB  Neck ROM: full  No difficulty expected  Dental - normal exam     Pulmonary - negative pulmonary ROS and normal exam   Cardiovascular - negative cardio ROS  Exercise tolerance: good (4-7 METS)    Rhythm: regular  Rate: normal        Neuro/Psych  (+) psychiatric history Anxiety,     GI/Hepatic/Renal/Endo    (+) obesity,       Musculoskeletal (-) negative ROS    Abdominal     Abdomen: soft.   Substance History - negative use     OB/GYN negative ob/gyn ROS         Other - negative ROS                       Anesthesia Plan    ASA 1     general with block     intravenous induction     Anesthetic plan, all risks, benefits, and alternatives have been provided, discussed and informed consent has been obtained with: patient.  Use of blood products discussed with patient  Consented to blood products.

## 2021-11-19 NOTE — ANESTHESIA POSTPROCEDURE EVALUATION
Patient: Zeyad Pedro    Procedure Summary     Date: 11/19/21 Room / Location:  [facility] /  [facility]    Anesthesia Start: 1059 Anesthesia Stop: 1332    Procedure: KNEE ANTERIOR CRUCIATE LIGAMENT RECONSTRUCTION WITH HAM STRING GRAFT-autograft, possible allograft supplement, meniscal and cartilage surgery as indicated (Right Knee) Diagnosis:       Rupture of anterior cruciate ligament of right knee, subsequent encounter      Complex tear of lateral meniscus of right knee as current injury, subsequent encounter      (Rupture of anterior cruciate ligament of right knee, subsequent encounter [S83.511D])      (Complex tear of lateral meniscus of right knee as current injury, subsequent encounter [S83.271D])    Surgeons: Negro Sahu MD Provider: Sanjay Ernandez CRNA    Anesthesia Type: general with block ASA Status: 1          Anesthesia Type: general with block    Vitals  Vitals Value Taken Time   /67 11/19/21 1425   Temp 98 °F (36.7 °C) 11/19/21 1337   Pulse 75 11/19/21 1427   Resp 20 11/19/21 1420   SpO2 92 % 11/19/21 1428   Vitals shown include unvalidated device data.        Post Anesthesia Care and Evaluation    Patient location during evaluation: PHASE II  Patient participation: complete - patient participated  Level of consciousness: awake and alert  Pain score: 2  Pain management: adequate  Airway patency: patent  Anesthetic complications: No anesthetic complications  PONV Status: none  Cardiovascular status: acceptable  Respiratory status: acceptable  Hydration status: acceptable

## 2021-11-29 ENCOUNTER — OFFICE VISIT (OUTPATIENT)
Dept: ORTHOPEDIC SURGERY | Facility: CLINIC | Age: 26
End: 2021-11-29

## 2021-11-29 VITALS — BODY MASS INDEX: 34.35 KG/M2 | WEIGHT: 245.4 LBS | HEIGHT: 71 IN

## 2021-11-29 DIAGNOSIS — Z98.890 S/P ACL SURGERY: Primary | ICD-10-CM

## 2021-11-29 PROCEDURE — 99024 POSTOP FOLLOW-UP VISIT: CPT | Performed by: ORTHOPAEDIC SURGERY

## 2021-11-29 NOTE — PROGRESS NOTES
CC:Status post right knee ACL reconstruction with hamstring autograft and allograft supplement, lateral meniscal repair, DOS 11/19/2021    Interval history: Patient returns to clinic today, 1 week from surgery, doing well with physical therapy in regards to strengthening, range of motion, and ambulation.  Denies any locking, catching, or giving way of right knee.  Has continued to wear the brace as instructed.  Denies any numbness, tingling, or issues with incisions over the knee. He has not started PT yet.     Physical exam:              Right knee:   Incisions- clean dry, and intact, healing well   Effusion moderate   ROM- 5- 60 degrees   Strength-  4/5   Positive sensation light touch    Brisk cap refill   No calf pain, negative Mari's sign bilateral lower extremities        Impression: Status post right knee ACL reconstruction, lateral meniscal repair    Plan:  1.  Start with physical therapy 3 times per week for work on range of motion and strengthening.  Discussed with patient that he should have started this within 3 to 5 days after surgery, some limitation due to the holidays.  He wants to go to [facility] so we gave him a prescription today.  I told him he needs to call them today and get in with them today or tomorrow so he can get started.  Otherwise he will have issues with stiffness.  2.  Keep hinge knee brace locked during any weightbearing activities, may unlock for PT and while seated or supine.  3. Will wean off crutches as gait pattern normalizes under the direction of physical therapist  4.  Will follow-up in 3 weeks for reevaluation of motion and strength and xrays.          Transcribed from ambient dictation for Negro Sahu MD by Brittny Amin.  11/29/21   10:59 EST    Patient verbalized consent to the visit recording.  I have personally performed the services described in this document as transcribed by the above individual, and it is both accurate and complete.  Nergo Sahu MD   11/29/2021  11:19 EST

## 2021-12-01 NOTE — OP NOTE
Date of Surgery: 11/19/2021    PREOPERATIVE DIAGNOSES:  1. Right knee anterior cruciate ligament tear.    2. Right knee lateral meniscal tear.     POSTOPERATIVE DIAGNOSES:  1. Right knee anterior cruciate ligament tear.    2. Right knee lateral meniscal tear.     PROCEDURES PERFORMED:    1. Right knee anterior cruciate ligament reconstruction with hamstring autograft and allograft supplement.    2. Right knee lateral meniscus repair.     SURGEON: Negro Sahu MD     ASSISTANT: Baldo Mcdonough CSA-retraction, irrigation, closure, dressing application    ANESTHESIA: General endotracheal anesthesia with a regional block.     ESTIMATED BLOOD LOSS: <500ml    URINE OUTPUT: Not recorded.     FLUIDS: Per Anesthesia.     COMPLICATION: None.     SPECIMEN: None.     DRAIN: None.     Tourniquet Times:     Inflated: 11/19/2021 11:28 AM     Deflated: 11/19/2021  1:17 PM    IMPLANT: Hamstring autograft, 6-strand, 10 mm in size on tibial and femoral sides, Smith and Nephew adjustable loop Endobutton, Smith & Nephew 10 x 30 mm Biosure interference screw, Mitek 36 mm screw with 17 mm washer for secondary tibial fixation, semitendinosus allograft x1, FasT-Fix all inside meniscal repair device x2 for lateral meniscal repair    EXAMINATION UNDER ANESTHESIA: Passive range of motion of right knee is 0° to  130°, minimal effusion, stable to varus and valgus stress 0° and 30°. Positive pivot shift, grade 2B Lachman, 2+ anterior drawer. No endpoint, negative posterior drawer, negative posterolateral drawer, negative dial test. Midline patellar tracking and 2+ dorsalis pedis pulse right foot.     ARTHROSCOPIC FINDINGS:    1. Suprapatellar pouch: Free of loose bodies.    2. Medial and lateral gutters: Free of loose bodies.    3. Patellofemoral joint: No evidence of articular cartilage wear.    4. Medial compartment: No evidence of meniscal tear, cartilage intact.    5. Lateral compartment: Complex tear posterior horn lateral meniscus with  radial and parrot beak components extending from the posterior horn towards the lateral meniscal root with peripheral rim of meniscal tissue intact involving approximately 20% total meniscal volume, cartilage intact.    6. Notch: ACL complete rupture, PCL intact.     INDICATIONS: The patient is a pleasant 25 y.o. male with significant history of buckling episode to the right knee with associated pain and swelling. MRI indicated a complete rupture of anterior cruciate ligament. Given buckling episodes as well as associated pain and the patient's desire to continue to participate in cutting and pivoting activities, patient wished to proceed with above-mentioned surgery.     INFORMED CONSENT: Patient was explained details of the procedure, as well as risks, benefits, and alternatives as documented on the History and Physical, and had all questions answered prior to signing the operative consent form. No guarantees were given in regard to results of the surgery.     DESCRIPTION OF PROCEDURE: The patient was seen, evaluated, and cleared for surgery by Anesthesia. Met in the preoperative holding area and the operative site marked, consent was reviewed, History and Physical was completed, and preoperative labs were reviewed. A regional block was then placed per Anesthesia. The patient was then taken to the operating room and placed in the supine position on a regular OR table. After successful intubation per Anesthesia examination under anesthesia was carried out at this point in time. A nonsterile tourniquet was applied to the right lower extremity. The right leg was placed in a leg gallagher and the contralateral leg was placed in a stirrup. The patient was secured to the table with a waist strap. All bony prominences were well-padded. The right leg was then sterilely prepped and draped in a standard fashion.     Formal timeout was completed, including confirmation of History and Physical, operative consent, surgical site,  patient identification number, and preoperative antibiotic administration. The right leg was then exsanguinated and the tourniquet was inflated to 250 mmHg. The procedure was then begun with a standard 4 cm incision over the anteromedial face of the tibia through skin and subcutaneous tissue with a #15 blade. Blunt dissection was carried out through the subcutaneous and sartorial fascia layer to identify the gracilis and semitendinosus tendons at this time. Sartorial fascia was then elevated and distal attachment of both these tendons was released together. Gracilis and semitendinosus tendons were then isolated from the sartorial fascia and distal end was whipstitched with a running locking suture. Adhesions were removed under direct visualization with the Metzenbaum scissors. Tendon stripper was then used to harvest each of these tendons in a standard fashion. Tendons were then taken to the back table and muscular tissue was stripped from the tissue. Proximal end of the tendons was whipstitched with a running locking stitch and autograft tissue was sized at this point time noted to be 7 mm in diameter.  Thus a semitendinosus allograft was opened on the back table, whipstitched on both proximal and distal ends and sized with the autograft tissue and noted to be 10 mm in diameter.  Thus the 6 strand graft was assembled over an adjustable loop Endobutton on the back table under 15 pounds of tension. Proximal end of the graft was whipstitched with a 0 Vicryl to tubularize the graft at this time. Moist saline-soaked gauze was then placed over the graft.     Attention was then returned to the knee were a standard anterolateral portal was made with a #11 blade followed by insertion of blunt trocar and a cannula. The scope was then inserted at this point in time. Standard anteromedial and far medial portal were then made with a spinal needle using outside-in technique. A probe was then inserted and diagnostic arthroscopic  exam was carried out at this point in time with findings noted above.     Attention was then turned to lateral meniscal repair.  Given the complex nature of the posterior horn lateral meniscal tear, some debridement was completed this point time particularly of the parrot beak portion extending from the posterior horn with debridement being completed with ablation wand, biter, and shaver at this time.  Approximately 5% total meniscal volume was resected.  The remainder of the tear was noted to be amenable to side to side repair. Ball tip rasp was used to debride the tear site. Fast fix meniscal repair device x 2 were passed in horizontal mattress fashion, cinched down with pusher, and cut short. Following repair, probe was re-inserted and meniscal tear noted to be well reduced and secure at this point.     Attention was then turned to anterior cruciate ligament reconstruction. Debridement of the fat pad as well as a small notchplasty and debridement of the remainder of the ACL stump was completed at this point in time with the shaver as well as ablation wand. The femoral attachment site on the lateral wall was identified at this time. The outside-in Guerin and Nephew pinpoint guide was then placed. Graft had been sized as 10 mm and the 10 mm guide was used to at this time. A 3 cm incision was made over the lateral femoral condyle proximal to the epicondyle at this time through skin only. Trocar guide was advanced to the lateral cortex. Guide pin was fired into the knee joint and noted to be in acceptable position.  4.5 mm reamer was then passed over the guidepin with a curette protecting the tip of the guidepin. An 10 mm TruNav reamer was then passed in line over the guide pin at this time, bony debris was removed with irrigation and suction and the TruNav reamer was flipped into its extended position and locked into place. Length of the tunnel was then measured with the TruNav device.  Retrograde reaming was then  completed, leaving a 10 mm lateral cortical wall. TruNav reamer was once again advanced into the notch, flipped into its in line position, and central pin was removed. A shaver was then used to chamfer smooth the edges of the tunnel as well as remove bony debris.  Shuttling suture was then passed through the reamer and retrieved through the lateral portal.  Reamer was then removed from the femoral tunnel at this time.    Attention was then turned to the tibial tunnel with the tibial guide set at 55°. Tibial guide was inserted through the far medial portal. The tip of the guide was placed to the anterior horn of the lateral meniscus and just lateral to the medial tibial spine. Guide pin was fired into the joint at this point in time through the prior hamstring incision. A 6 mm reamer was used followed by 10 mm reamer in standard fashion creating the tibial tunnel. Edges of the tunnel were chamfered smooth with a shaver.     The passing suture was then retrieved through the distal end of the tibial tunnel at this time. Graft was then passed through mineral oil and brought to the operative site. Lead sutures from the adjustable loop endobutton were passed with a shuttling suture brought up through the lateral soft tissues.  Adjustable loop Endobutton was then pulled into the joint and out through the femoral tunnel until it was noted to be outside the lateral cortex and was successfully flipped at this time while staying below the IT band.  Using the adjustable loop of the ultra button, graft was then pulled into the notch and subsequently into the femoral tunnel until it was pulled up to the marked length of the femoral tunnel. Traction was pulled aggressively across the tibial side of the graft and there was noted to be appropriate fixation on the femoral side of the graft. The knee was then cycled from 0° to 130° 30 times to reduce creep in the graft.     Arthroscopic images were then taken with the knee in full  extension with no evidence of anterior, medial, or lateral impingement noted.     Attention was then turned to tibial-sided fixation with the knee brought to 30° of flexion with traction pulled across the tibial side of the graft as well as a gentle posterior drawer. A flexible guidepin was placed followed by insertion of 10 x 30 mm Biosure interference screw into the tibial tunnel while traction was maintained across the graft distally and gentle posterior drawer was placed on the proximal tibia. Arthroscope was re-inserted, graft examined and noted to have good tension with a probe as well as on Lachman exam with direct visualization.  Secondary tibial fixation was then obtained with Mitek screw being placed in bicortical fashion with extraneous graft and sutures tensioned around the screw and washer which served as a post for secondary tibial fixation.  Once graft and sutures were secured in place, extraneous graft and sutures were sharply excised with a knife.    The knee was then thoroughly irrigated with normal saline through the scope and with an open cannula.  Additional traction was then pulled across the adjustable loop from the ultra button and final tensioning was achieved of the graft.  Fluid was then evacuated from the knee with suction. Open incisions were thoroughly irrigated at this time with Betadine lavage as well as normal saline. Attention was then turned to closure of the wounds with subcutaneous layer closed with 2-0 Vicryl, and skin closure with 3-0 nylon and portal sites and 3-0 strata fix as well as Prineo mesh and glue at medial tibial incision. The wounds were dressed with Telfa, 4 x 4's, ABD pad, Webril, ACE wrap, ice pack, and a knee brace locked in extension.     At the end of the procedure all lap, needle, and sponge counts were correct x2. The patient had brisk capillary refill to all digits of the right lower extremity. Compartments were soft and easily compressible at the end of  the procedure.     DISPOSITION: The patient was extubated per Anesthesia and taken to the recovery room in stable condition. Patient will be discharged home in the care of family and follow up in 1 week for wound check. Will start physical therapy on postoperative day 3 or 4, weight-bear as tolerated, and brace locked in extension when upright.  Aspirin 81 mg daily for DVT prophylaxis for 2 weeks.  I discussed results of the procedure as well as postoperative precautions with the family after the surgery and they had all questions answered at that time.

## 2021-12-16 ENCOUNTER — OFFICE VISIT (OUTPATIENT)
Dept: ORTHOPEDIC SURGERY | Facility: CLINIC | Age: 26
End: 2021-12-16

## 2021-12-16 VITALS — BODY MASS INDEX: 34.3 KG/M2 | HEIGHT: 71 IN | WEIGHT: 245 LBS

## 2021-12-16 DIAGNOSIS — Z98.890 S/P ACL SURGERY: Primary | ICD-10-CM

## 2021-12-16 PROCEDURE — 73562 X-RAY EXAM OF KNEE 3: CPT | Performed by: ORTHOPAEDIC SURGERY

## 2021-12-16 PROCEDURE — 99024 POSTOP FOLLOW-UP VISIT: CPT | Performed by: ORTHOPAEDIC SURGERY

## 2021-12-16 RX ORDER — CYCLOBENZAPRINE HCL 5 MG
5 TABLET ORAL 3 TIMES DAILY PRN
Qty: 45 TABLET | Refills: 0 | Status: SHIPPED | OUTPATIENT
Start: 2021-12-16 | End: 2022-02-24

## 2021-12-16 RX ORDER — PREDNISONE 10 MG/1
TABLET ORAL
Qty: 39 TABLET | Refills: 0 | Status: SHIPPED | OUTPATIENT
Start: 2021-12-16 | End: 2022-01-07

## 2021-12-16 RX ORDER — IBUPROFEN 800 MG/1
800 TABLET ORAL EVERY 6 HOURS PRN
COMMUNITY
End: 2022-01-07

## 2021-12-16 NOTE — PROGRESS NOTES
The patient has consented to being recorded using [facility].    CC:Status post right knee ACL reconstruction with hamstring autograft and allograft supplement, lateral meniscal repair, 11/19/2021    Interval history: Patient returns to clinic today, 4 weeks from surgery, The patient is doing well overall and notes mild improvement of symptoms. He is attending physical therapy, but per patient reports have not been utilizing my ACL protocol. Mild pain and tenderness noted.   Physical exam:    Right knee:  Incisions-well-healed     ROM 5 to 90 degrees  He has a 5 degree extensor lag on straight leg raise.  His knee is flexible at endpoint, but significant resistant pain with attempts at maximal motion.   Grade 1A  Lachman  Stable opening on varus and valgus stress at 5 and 30  No joint line pain.  Positive sensation light touch all distributions symmetric to contralateral side  Brisk cap refill all digits  1+ dorsalis pedis pulse  Strength- 4 of 5, moderate central alignment  stable to testing  Positive sensation light touch   Brisk cap refill    Imaging: three-view x-rays of right knee from today's visit including AP, lateral, and notch views, ordered and reviewed by me, compared to preoperative x-rays.  Findings included stable position of femoral and tibial bone tunnels as well as ACL hardware, no evidence of intra-articular loose body, anatomic alignment right knee, no evidence of fracture or subluxation.    Impression: Status post right knee ACL reconstruction, lateral meniscal repair    Plan:  1. Discussed treatment options at length with patient at today's visit.   2. Continue with physical therapy and work on home exercises to improve with range of motion and strength.  He is to get much more aggressive with physical therapy and with his home exercises in regards to his range of motion which is quite stiff at this point time.  I sent in a prednisone taper as well as muscle relaxer to try to help him with this  effort.  If he is not better in 2 weeks at follow-up we may consider intra-articular injection versus manipulation depending on his progress.  He understood the importance of this.  3. Continue with knee brace in an upright position  4. Follow-up 2 weeks    Scribed for Negro Sahu MD by Arslan Russo.  12/16/2021  17:25 EST

## 2021-12-21 ENCOUNTER — IMMUNIZATION (OUTPATIENT)
Dept: VACCINE CLINIC | Facility: HOSPITAL | Age: 26
End: 2021-12-21

## 2021-12-21 PROCEDURE — 91300 HC SARSCOV02 VAC 30MCG/0.3ML IM: CPT | Performed by: INTERNAL MEDICINE

## 2021-12-21 PROCEDURE — 0004A HC ADM SARSCOV2 30MCG/0.3ML BOOSTER: CPT | Performed by: INTERNAL MEDICINE

## 2022-01-07 ENCOUNTER — OFFICE VISIT (OUTPATIENT)
Dept: ORTHOPEDIC SURGERY | Facility: CLINIC | Age: 27
End: 2022-01-07

## 2022-01-07 VITALS — WEIGHT: 245 LBS | BODY MASS INDEX: 34.3 KG/M2 | HEIGHT: 71 IN

## 2022-01-07 DIAGNOSIS — S83.271D COMPLEX TEAR OF LATERAL MENISCUS OF RIGHT KNEE AS CURRENT INJURY, SUBSEQUENT ENCOUNTER: ICD-10-CM

## 2022-01-07 DIAGNOSIS — Z98.890 S/P ACL SURGERY: Primary | ICD-10-CM

## 2022-01-07 PROCEDURE — 99024 POSTOP FOLLOW-UP VISIT: CPT | Performed by: ORTHOPAEDIC SURGERY

## 2022-01-07 NOTE — PROGRESS NOTES
CC:Status post right knee ACL reconstruction with hamstring autograft and allograft supplement, lateral meniscal repair, 11/19/2021     Interval history: Patient returns to clinic today stating he is doing much better at this point time in regards to his range of motion and strength, he is continuing to work with physical therapy on a regular basis at this point time.  Feels like his swelling is much improved.  Denies any numbness or tingling.    Physical exam:     Right knee:  Incisions-well-healed  Active range of motion 0 to 125 degrees, 4 out of 5 strength on extension, 4+ out of 5 strength on flexion.  Minimal extensor lag on straight leg raise.  Mild effusion  Lachman- stable to testing  Positive sensation light touch   Brisk cap refill      Impression: Status post right knee ACL reconstruction, lateral meniscal repair     Plan:  1.  Continue work on range of motion and strength at this point time.  He will continue focusing on work with physical therapy as well as home exercise program.  2.  Follow-up in 4 weeks for reassessment.  If he still having issues at that time may consider intra-articular injection versus supplementation with oral steroid again to help with residual stiffness.  3.  Transition to knee sleeve especially for flat level ground at this point time.  4.  Patient was agreement with this plan and all questions answered

## 2022-01-13 RX ORDER — PREDNISONE 10 MG/1
TABLET ORAL
Qty: 39 TABLET | Refills: 0 | OUTPATIENT
Start: 2022-01-13

## 2022-01-13 NOTE — TELEPHONE ENCOUNTER
Auto refill to PCP- Tried to contact patient x 2 with no answer- will await a call from the patient.

## 2022-01-17 ENCOUNTER — TELEPHONE (OUTPATIENT)
Dept: ORTHOPEDIC SURGERY | Facility: CLINIC | Age: 27
End: 2022-01-17

## 2022-01-17 RX ORDER — PREDNISONE 10 MG/1
TABLET ORAL
Qty: 39 TABLET | Refills: 0 | Status: SHIPPED | OUTPATIENT
Start: 2022-01-17 | End: 2022-02-24

## 2022-01-17 NOTE — TELEPHONE ENCOUNTER
Please see below as patient thought he was getting another oral steroid RX:    CC:Status post right knee ACL reconstruction with hamstring autograft and allograft supplement, lateral meniscal repair, 11/19/2021    Plan:  1.  Continue work on range of motion and strength at this point time.  He will continue focusing on work with physical therapy as well as home exercise program.  2.  Follow-up in 4 weeks for reassessment.  If he still having issues at that time may consider intra-articular injection versus supplementation with oral steroid again to help with residual stiffness.  3.  Transition to knee sleeve especially for flat level ground at this point time.  4.  Patient was agreement with this plan and all questions answered

## 2022-01-17 NOTE — TELEPHONE ENCOUNTER
Provider: DR. JIMENEZ     Caller:  ROSITA ABDUL    Relationship to Patient: SELF     Pharmacy:  [facility]- IN PATIENT'S CHART     Phone Number:  [phone]    Reason for Call:  PATIENT ADVISED HE WAS SUPPOSED TO GET A PRESCRIPTION FOR ORAL STEROID AND HE HAS NOT RECEIVED IT. PATIENT ADVISED TO SEND MEDICATION TO [facility] ON [address].

## 2022-02-24 ENCOUNTER — OFFICE VISIT (OUTPATIENT)
Dept: ORTHOPEDIC SURGERY | Facility: CLINIC | Age: 27
End: 2022-02-24

## 2022-02-24 VITALS — WEIGHT: 245 LBS | HEIGHT: 71 IN | BODY MASS INDEX: 34.3 KG/M2

## 2022-02-24 DIAGNOSIS — S83.271D COMPLEX TEAR OF LATERAL MENISCUS OF RIGHT KNEE AS CURRENT INJURY, SUBSEQUENT ENCOUNTER: ICD-10-CM

## 2022-02-24 DIAGNOSIS — Z98.890 S/P ACL SURGERY: Primary | ICD-10-CM

## 2022-02-24 PROCEDURE — 99212 OFFICE O/P EST SF 10 MIN: CPT | Performed by: ORTHOPAEDIC SURGERY

## 2022-04-07 ENCOUNTER — OFFICE VISIT (OUTPATIENT)
Dept: ORTHOPEDIC SURGERY | Facility: CLINIC | Age: 27
End: 2022-04-07

## 2022-04-07 VITALS — BODY MASS INDEX: 34.3 KG/M2 | WEIGHT: 245 LBS | HEIGHT: 71 IN

## 2022-04-07 DIAGNOSIS — S83.271D COMPLEX TEAR OF LATERAL MENISCUS OF RIGHT KNEE AS CURRENT INJURY, SUBSEQUENT ENCOUNTER: ICD-10-CM

## 2022-04-07 DIAGNOSIS — Z98.890 S/P ACL SURGERY: Primary | ICD-10-CM

## 2022-04-07 PROCEDURE — 73562 X-RAY EXAM OF KNEE 3: CPT | Performed by: ORTHOPAEDIC SURGERY

## 2022-04-07 PROCEDURE — 99212 OFFICE O/P EST SF 10 MIN: CPT | Performed by: ORTHOPAEDIC SURGERY

## 2022-04-07 NOTE — PROGRESS NOTES
Subjective:     Patient ID: Zeyad Pedor is a 26 y.o. male.    Chief Complaint:  Follow-up status post right knee ACL reconstruction with hamstring autograft and allograft supplement, lateral meniscal repair-11/19/2021    History of Present Illness  Zeyad Pedro returns to clinic today for evaluation of status post right knee ACL reconstruction.    The patient is doing well overall, and notes improvement in his symptoms. He reports an episode of mild tightness, pain, and crepitus over the patellofemoral joint with ambulation. He denies any significant episodes since then. He does have minimal swelling in the right knee that seems to be improving on its own. He has been doing well with strength building and improving range of motion. He had x-rays done that were reviewed with him today. The patient denies any buckling, locking, or catching of his knee.     Social History     Occupational History   • Not on file   Tobacco Use   • Smoking status: Never Smoker   • Smokeless tobacco: Never Used   Vaping Use   • Vaping Use: Never used   Substance and Sexual Activity   • Alcohol use: Yes     Comment: monthly, rarely   • Drug use: No   • Sexual activity: Defer      Past Medical History:   Diagnosis Date   • Anxiety    • History of concussion      Past Surgical History:   Procedure Laterality Date   • KNEE ACL RECONSTRUCTION Left 09/24/2015   • KNEE ACL RECONSTRUCTION Right 11/19/2021    Procedure: KNEE ANTERIOR CRUCIATE LIGAMENT RECONSTRUCTION WITH HAM STRING GRAFT-autograft, possible allograft supplement, meniscal and cartilage surgery as indicated;  Surgeon: Negro Sahu MD;  Location: [facility];  Service: Orthopedics;  Laterality: Right;  RIGHT KNEE ARTHROSCOPY,ANTERIOR CRUCIATE LIGAMENT RECONSTRUCTION WITH ALLOGRAFT AND LATERAL MENISCAL REPAIR.   • NASAL SEPTUM SURGERY         Family History   Problem Relation Age of Onset   • No Known Problems Mother    • Thyroid disease Father    • Malig Hyperthermia Neg Hx   "        Review of Systems        Objective:  Vitals:    04/07/22 1242   Weight: 111 kg (245 lb)   Height: 180.3 cm (71\")         04/07/22  1242   Weight: 111 kg (245 lb)     Body mass index is 34.17 kg/m².  General: No acute distress.  Resp: normal respiratory effort  Skin: no rashes or wounds; normal turgor  Psych: mood and affect appropriate; recent and remote memory intact          Ortho Exam       Right Knee-    Range of motion is 0 to 135 degrees  4+/5 on flexion  4+/5 on extension  Stable opening on varus and valgus stress at 0 and 30 degrees    Imaging:  Right Knee X-Ray  Indication: Status post anterior cruciate ligament reconstruction    AP, Lateral, and notch views    Findings:  Tibial and femoral tunnels in stable position and with no significant tunnel lysis  Implants evident with no evidence of loosening or disruption  Acceptable overall alignment  No evidence of intra-articular loose body    Compared to prior office x-rays    Assessment:        1. S/P ACL surgery    2. Complex tear of lateral meniscus of right knee as current injury, subsequent encounter           Plan:          1. Discussed treatment options at length with patient at today's visit. Continue with hip, core, and quad strengthening at this point in time. Avoid aggressive activities on uneven surfaces, trying to stay straight line on flat level ground until his additional strength is built up.  2. I recommend he continue to work on leg press, rowing machine, stationary bike, as well as squats and straight leg raises with resistance to improve strength and stability in the knee.   3. Follow up in 4 months with repeat x-rays, right knee ACL series.      Zeyad Pedro was in agreement with plan and had all questions answered.     Orders:  Orders Placed This Encounter   Procedures   • XR Knee 3 View Right       Medications:  No orders of the defined types were placed in this encounter.      Followup:  Return in about 4 months (around 8/7/2022) " for xrays needed at follow up.    Diagnoses and all orders for this visit:    1. S/P ACL surgery (Primary)  -     XR Knee 3 View Right    2. Complex tear of lateral meniscus of right knee as current injury, subsequent encounter          Dictated utilizing Dragon dictation     Transcribed from ambient dictation for Negro Sahu MD by Ange Brown.  04/07/22   18:21 EDT    Patient verbalized consent to the visit recording.  I have personally performed the services described in this document as transcribed by the above individual, and it is both accurate and complete.  Negro Sahu MD  4/10/2022  21:40 EDT

## 2022-08-18 ENCOUNTER — OFFICE VISIT (OUTPATIENT)
Dept: ORTHOPEDIC SURGERY | Facility: CLINIC | Age: 27
End: 2022-08-18

## 2022-08-18 VITALS — WEIGHT: 245 LBS | BODY MASS INDEX: 34.3 KG/M2 | HEIGHT: 71 IN

## 2022-08-18 DIAGNOSIS — Z98.890 S/P ACL RECONSTRUCTION: Primary | ICD-10-CM

## 2022-08-18 PROCEDURE — 99212 OFFICE O/P EST SF 10 MIN: CPT | Performed by: ORTHOPAEDIC SURGERY

## 2022-08-18 NOTE — PROGRESS NOTES
"Subjective:     Patient ID: Zeyad Pedro is a 26 y.o. male.    Chief Complaint:  Follow-up status post right knee ACL reconstruction with hamstring autograft and allograft supplement, lateral meniscal repair-11/19/2021  History of Present Illness  Zeyad Pedro returns to clinic today for evaluation of status post right knee ACL reconstruction. The patient is doing well overall, and notes improvement in his symptoms. He has been active with his knee. He has not returned to any sports at this point in time, but denies any buckling, catching, locking, or giving way. The patient reports mild swelling on recent travel to [place], but it did essentially resolve on its own and was not associated with activity.     Social History     Occupational History   • Not on file   Tobacco Use   • Smoking status: Never Smoker   • Smokeless tobacco: Never Used   Vaping Use   • Vaping Use: Never used   Substance and Sexual Activity   • Alcohol use: Not Currently     Comment: monthly, rarely   • Drug use: No   • Sexual activity: Yes     Partners: Female      Past Medical History:   Diagnosis Date   • Anxiety    • History of concussion      Past Surgical History:   Procedure Laterality Date   • KNEE ACL RECONSTRUCTION Left 09/24/2015   • KNEE ACL RECONSTRUCTION Right 11/19/2021    Procedure: KNEE ANTERIOR CRUCIATE LIGAMENT RECONSTRUCTION WITH HAM STRING GRAFT-autograft, possible allograft supplement, meniscal and cartilage surgery as indicated;  Surgeon: Negro Sahu MD;  Location: [facility];  Service: Orthopedics;  Laterality: Right;  RIGHT KNEE ARTHROSCOPY,ANTERIOR CRUCIATE LIGAMENT RECONSTRUCTION WITH ALLOGRAFT AND LATERAL MENISCAL REPAIR.   • NASAL SEPTUM SURGERY         Family History   Problem Relation Age of Onset   • No Known Problems Mother    • Thyroid disease Father    • Malig Hyperthermia Neg Hx          Review of Systems        Objective:  Vitals:    08/18/22 1245   Weight: 111 kg (245 lb)   Height: 180.3 cm (71\")      "    08/18/22  1245   Weight: 111 kg (245 lb)     Body mass index is 34.17 kg/m².  General: No acute distress.  Resp: normal respiratory effort  Skin: no rashes or wounds; normal turgor  Psych: mood and affect appropriate; recent and remote memory intact          Right Knee Exam     Comments:  Right Knee-  Incisions are well healed.  ROM 0 to 140 degrees  5/5 on flexion  5/5 on extension  Minimal soft tissue swelling.  No joint line pain.  Berto's exam- Negative  Effusion- None  Grade 1A Lachman  Anterior drawer- Negative  Posterior drawer- Negative  Stable opening on varus and valgus stress at 0 and 30  Positive sensation light touch all distributions symmetric to contralateral side  Brisk cap refill all digits               Imaging:  Right Knee X-Ray  Indication: Status post anterior cruciate ligament reconstruction    AP, Lateral, and notch views    Findings:  Tibial and femoral tunnels in stable position and with no significant tunnel lysis  Implants evident with no evidence of loosening or disruption  Acceptable overall alignment  No evidence of intra-articular loose body    Compared to prior office x-rays    Assessment:        1. S/P ACL reconstruction           Plan:          1. Discussed treatment options at length with patient at today's visit. The patient is doing well at this point in time. Continue home exercises.  2.  ACL exercises and home program given to patient today.  3. Recommended slowly ramping up on his activities in regards to sport over the next 2 to 3 months with plan for return to play as tolerated at approximately 1 year postop.  4.  I will follow up with him as needed if he has any recurrent issues or episodes. He was in agreement, had all questions answered.      Zeyad Pedro was in agreement with plan and had all questions answered.     Orders:  Orders Placed This Encounter   Procedures   • XR Knee 3 View Right       Medications:  No orders of the defined types were placed in this  encounter.      Followup:  Return if symptoms worsen or fail to improve.    Diagnoses and all orders for this visit:    1. S/P ACL reconstruction (Primary)  -     XR Knee 3 View Right          Dictated utilizing Dragon dictation   Transcribed from ambient dictation for Negro Sahu MD by Radha Moses.  08/18/22   14:30 EDT    Patient verbalized consent to the visit recording.  I have personally performed the services described in this document as transcribed by the above individual, and it is both accurate and complete.  Negro Sahu MD  8/24/2022  22:14 EDT

## (undated) DEVICE — DYONICS 5.5 FULL RADIUS BONECUTTER                                    BLADES, ORANGE, 8000 MAXIMUM RPM,                                    PACKAGED 6 PER BOX, STERILE

## (undated) DEVICE — GLV SURG SENSICARE PI LF PF 7.0

## (undated) DEVICE — TOWEL,OR,DSP,ST,BLUE,STD,4/PK,20PK/CS: Brand: MEDLINE

## (undated) DEVICE — LAG ARTHROSCOPY: Brand: MEDLINE INDUSTRIES, INC.

## (undated) DEVICE — SKIN PREP TRAY W/CHG: Brand: MEDLINE INDUSTRIES, INC.

## (undated) DEVICE — INTENDED FOR TISSUE SEPARATION, AND OTHER PROCEDURES THAT REQUIRE A SHARP SURGICAL BLADE TO PUNCTURE OR CUT.: Brand: BARD-PARKER ® STAINLESS STEEL BLADES

## (undated) DEVICE — BLD SCLPL SURG PREM SS SZ15C

## (undated) DEVICE — WRAP KNEE COLD THERAPY

## (undated) DEVICE — SUT VIC 0/0 UR6 27IN DYED J603H

## (undated) DEVICE — SYS CLS SKIN PREMIERPRO EXOFINFUSION 22CM

## (undated) DEVICE — SPNG GZ WOVN 4X4IN 12PLY 10/BX STRL

## (undated) DEVICE — ENDOSCOPIC CANNULATED DRILL BIT,                                    4.5 MM, STERILE

## (undated) DEVICE — PILOT DRILL FOR USE WITH MITEK CORTICAL & CANCELLOUS SCREWS 3.2MM

## (undated) DEVICE — ST TB GOFLO STRL

## (undated) DEVICE — PREP SOL POVIDONE/IODINE BT 4OZ

## (undated) DEVICE — GLV SURG SENSICARE PI PF LF 7 GRN STRL

## (undated) DEVICE — PAD UNDERCAST WYTEX 4IN 4YD LF STRL

## (undated) DEVICE — ACUFEX TRUNAV RETROGRADE DRILL 10 MM: Brand: ACUFEX

## (undated) DEVICE — WEREWOLF FLOW 50 COBLATION WAND: Brand: COBLATION

## (undated) DEVICE — TRAP FLD MINIVAC MEGADYNE 100ML

## (undated) DEVICE — CLEAR-TRAC DISPOSABLE STOPCOCK: Brand: CLEAR-TRAC

## (undated) DEVICE — PATIENT RETURN ELECTRODE, SINGLE-USE, CONTACT QUALITY MONITORING, ADULT, WITH 9FT CORD, FOR PATIENTS WEIGING OVER 33LBS. (15KG): Brand: MEGADYNE

## (undated) DEVICE — SOL ISO/ALC RUB 70PCT 4OZ

## (undated) DEVICE — IRRIGATOR BULB ASEPTO 60CC STRL

## (undated) DEVICE — SUT MNCRYL 3/0 PS2 27IN Y427H

## (undated) DEVICE — SUT VIC 2/0 CP2 CR8 18IN J762D

## (undated) DEVICE — TUBING, SUCTION, 1/4" X 12', STRAIGHT: Brand: MEDLINE

## (undated) DEVICE — PK BASIC ORTHO 90

## (undated) DEVICE — DRSNG PAD ABD 8X10IN STRL

## (undated) DEVICE — LEGGINGS, PAIR, CLEAR, STERILE: Brand: MEDLINE

## (undated) DEVICE — DRSNG SURESITE WNDW 4X4.5

## (undated) DEVICE — MAT FLR ABSORBENT LG 4FT 10 2.5FT

## (undated) DEVICE — DISPOSABLE TOURNIQUET CUFF SINGLE BLADDER, SINGLE PORT AND LUER LOCK CONNECTOR: Brand: COLOR CUFF

## (undated) DEVICE — DRSNG GZ PETROLTM XEROFORM CURAD 1X8IN STRL

## (undated) DEVICE — BNDG ELAS ELITE V/CLOSE 4IN 5YD LF

## (undated) DEVICE — TRANSPOSAL ULTRAFLEX DUO/QUAD ULTRA CART MANIFOLD

## (undated) DEVICE — APPL CHLORAPREP HI/LITE 26ML ORNG

## (undated) DEVICE — ADAPT DB SPIKE 2PCT FOR AR6400 TBG

## (undated) DEVICE — BOWL PLSTC LG 32OZ BLU STRL

## (undated) DEVICE — Device

## (undated) DEVICE — GLV SURG NEOLON 2G PF LF 7.5 STRL

## (undated) DEVICE — SUT VIC 0 CT1 36IN J946H

## (undated) DEVICE — GLV SURG SENSICARE PI MIC PF SZ8 LF STRL

## (undated) DEVICE — 1.2 RAP-PAC B LATEX FREE: Brand: RAP-PAC

## (undated) DEVICE — FAST-FIX 360 STRAIGHT KNOT                                    PUSHER/CUTTER AND SLOTTED CANNULA SETS: Brand: FAST-FIX

## (undated) DEVICE — PENCL E/S ULTRAVAC TELESCP NOSE HOLSTR 10FT

## (undated) DEVICE — 3M™ IOBAN™ 2 ANTIMICROBIAL INCISE DRAPE 6650EZ: Brand: IOBAN™ 2

## (undated) DEVICE — SUT ETHLN 3/0 PS2 18 IN 1669H

## (undated) DEVICE — DRSNG TELFA PAD NONADH STR 1S 3X8IN